# Patient Record
Sex: MALE | Race: WHITE
[De-identification: names, ages, dates, MRNs, and addresses within clinical notes are randomized per-mention and may not be internally consistent; named-entity substitution may affect disease eponyms.]

---

## 2017-02-24 ENCOUNTER — HOSPITAL ENCOUNTER (EMERGENCY)
Dept: HOSPITAL 25 - UCCORT | Age: 22
Discharge: LEFT BEFORE DISCHARGE | End: 2017-02-24
Payer: SELF-PAY

## 2017-02-24 DIAGNOSIS — Z53.21: ICD-10-CM

## 2017-02-24 DIAGNOSIS — R05: Primary | ICD-10-CM

## 2017-04-13 ENCOUNTER — HOSPITAL ENCOUNTER (EMERGENCY)
Dept: HOSPITAL 25 - UCCORT | Age: 22
Discharge: HOME | End: 2017-04-13
Payer: SELF-PAY

## 2017-04-13 VITALS — SYSTOLIC BLOOD PRESSURE: 122 MMHG | DIASTOLIC BLOOD PRESSURE: 63 MMHG

## 2017-04-13 DIAGNOSIS — J04.0: ICD-10-CM

## 2017-04-13 DIAGNOSIS — Z88.1: ICD-10-CM

## 2017-04-13 DIAGNOSIS — J06.9: Primary | ICD-10-CM

## 2017-04-13 PROCEDURE — 99211 OFF/OP EST MAY X REQ PHY/QHP: CPT

## 2017-04-13 PROCEDURE — G0463 HOSPITAL OUTPT CLINIC VISIT: HCPCS

## 2017-04-13 PROCEDURE — 87651 STREP A DNA AMP PROBE: CPT

## 2017-04-13 NOTE — UC
Respiratory Complaint HPI





- HPI Summary


HPI Summary: 





started with hoarse voice x 3 days, then last pm started with cough and 

subjective fever.  No SOB.  No hx pneumonia.  Does not smoke.  Hx 

tonsillectomy.  





- History of Current Complaint


Chief Complaint: UCRespiratory


Stated Complaint: FEVER COUGH


Time Seen by Provider: 04/13/17 08:04


Hx Obtained From: Patient


Onset/Duration: Gradual Onset, Lasting Days, Still Present


Timing: Constant


Severity Initially: Moderate


Severity Currently: Moderate


Pain Intensity: 0


Pain Scale Used: 0-10 Numeric


Character: Cough: Nonproductive


Aggravating Factors: Nothing


Alleviating Factors: Nothing


Associated Signs And Symptoms: Positive: URI, Nasal Congestion, Hoarseness





- Risk Factors


Pulmonary Embolism Risk Factors: Negative


Cardiac Risk Factors: Negative


Pseudomonas Risk Factors: Negative


Tuberculosis Risk Factors: Negative





- Allergies/Home Medications


Allergies/Adverse Reactions: 


 Allergies











Allergy/AdvReac Type Severity Reaction Status Date / Time


 


Clarithromycin [From Biaxin] Allergy  Hives Verified 04/13/17 07:46











Home Medications: 


 Home Medications





Dextromethorphan-Doxylamine-AC [Nite-Time Multi-Symptom C 15-6. mg] 2 cap 

PO ONCE PRN 04/13/17 [History Confirmed 04/13/17]











PMH/Surg Hx/FS Hx/Imm Hx


Previously Healthy: Yes


Endocrine History Of: 


   Denies: Diabetes, Thyroid Disease, Hyperthyroidism, Hypothyroidism, 

Dyslipidemia


Cardiovascular History Of: 


   Denies: Cardiac Disorders, Hypertension, Pacemaker/ICD, Myocardial Infarction

, Congestive Heart Failure, Atrial Fibrillation, Deep Vein Thrombosis, Bleeding 

Disorders


Respiratory History Of: 


   Denies: COPD, Asthma, Bronchitis, Pneumonia, Pulmonary Embolism


GI/ History Of: 


   Denies: Gastroesophageal Reflux, Ulcer, Gastrointestinal Bleed, Gall Bladder 

Disease, Kidney Stones, Diverticulitis, Renal Disease, Urosepsis


Neurological History Of: 


   Denies: TIA, CVA, Dementia, Seizures, Migraine


Psychological History Of: 


   Denies: Anxiety, Depression, Bipolar Disorder, Schizophrenia, Post Traumatic 

Stress Disorder


Cancer History Of: 


   Denies: Lung Cancer, Colorectal Cancer, Breast Cancer, Prostate Cancer, 

Cervical Cancer


Other History Of: 


   Negative For: HIV, Hepatitis B, Hepatitis C, Anticoagulant Therapy





- Surgical History


Surgical History: Yes


Surgery Procedure, Year, and Place: TONSILLECTOMY SUMMER 2016





- Family History


Known Family History: Positive: Hypertension


   Negative: Cardiac Disease





- Social History


Occupation: Student


Alcohol Use: None


Substance Use Type: None


Smoking Status (MU): Never Smoked Tobacco





- Immunization History


Vaccination Up to Date: Yes





Review of Systems


Constitutional: Fever


Skin: Negative


Eyes: Negative


ENT: Sore Throat, Other - hoarse voice


Respiratory: Negative


Cardiovascular: Negative


Gastrointestinal: Negative


Genitourinary: Negative


Motor: Negative


Neurovascular: Negative


Musculoskeletal: Negative


Neurological: Negative


Psychological: Negative


All Other Systems Reviewed And Are Negative: Yes





Physical Exam


Triage Information Reviewed: Yes


Appearance: No Pain Distress, Well-Nourished, Ill-Appearing


Vital Signs: 


 Initial Vital Signs











Temp  98.3 F   04/13/17 07:47


 


Pulse  87   04/13/17 07:47


 


Resp  16   04/13/17 07:47


 


BP  122/63   04/13/17 07:47


 


Pulse Ox  98   04/13/17 07:47











Vital Signs Reviewed: Yes


Eyes: Positive: Conjunctiva Clear


ENT: Positive: Hearing grossly normal, Pharyngeal erythema, TMs normal, Muffled/

hoarse voice


Neck: Positive: Supple, Nontender, No Lymphadenopathy


Respiratory: Positive: Lungs clear, Normal breath sounds, No respiratory 

distress, No accessory muscle use


Cardiovascular: Positive: RRR, No Murmur, Pulses Normal, Brisk Capillary Refill


Musculoskeletal: Positive: Strength Intact, ROM Intact


Neurological: Positive: Alert, Muscle Tone Normal


Psychological Exam: Normal


Skin Exam: Normal





UC Diagnostic Evaluation





- Laboratory


O2 Sat by Pulse Oximetry: 98





Respiratory Course/Dx





- Course


Course Of Treatment: rapid A neg





- Differential Dx/Diagnosis


Differential Diagnosis/HQI/PQRI: Bronchitis, Laryngitis, Other - strep


Provider Diagnoses: Laryngitis.  URI





Discharge





- Discharge Plan


Condition: Stable


Disposition: HOME


Patient Education Materials:  Laryngitis (ED), Acute Cough (ED)


Forms:  *School Release


Referrals: 


Non Staff,Doctor [Primary Care Provider] -

## 2017-04-17 ENCOUNTER — HOSPITAL ENCOUNTER (EMERGENCY)
Dept: HOSPITAL 25 - UCCORT | Age: 22
Discharge: HOME | End: 2017-04-17
Payer: COMMERCIAL

## 2017-04-17 VITALS — DIASTOLIC BLOOD PRESSURE: 55 MMHG | SYSTOLIC BLOOD PRESSURE: 102 MMHG

## 2017-04-17 DIAGNOSIS — R09.81: ICD-10-CM

## 2017-04-17 DIAGNOSIS — H10.31: Primary | ICD-10-CM

## 2017-04-17 DIAGNOSIS — R05: ICD-10-CM

## 2017-04-17 DIAGNOSIS — Z88.1: ICD-10-CM

## 2017-04-17 PROCEDURE — G0463 HOSPITAL OUTPT CLINIC VISIT: HCPCS

## 2017-04-17 PROCEDURE — 99212 OFFICE O/P EST SF 10 MIN: CPT

## 2017-04-17 NOTE — UC
Eye Complaint HPI





- HPI Summary


HPI Summary: 





PT P/W WITH RED IRRITATED EYE.  PT WOKE THIS AM WITH RT EYE CRUSTED SHUT.  

OFTEN GET PINK EYE WHEN HE HAS A COLD.   HAS HAD MILD COLD SX X5 DAYS.  COUGH, 

RUNNY NOSE.  NO ST, EAR ACHE





- History of Current Complaint


Chief Complaint: UCEye


Stated Complaint: RIGHT EYE COMPLAINT


Time Seen by Provider: 04/17/17 07:49


Hx Obtained From: Patient


Onset/Duration: Sudden Onset, Lasting Hours, Still Present


Timing: Constant


Severity Initially: Mild


Severity Currently: Mild


Pain Intensity: 1


Location of Injury: Conjunctiva, Sclera


Character: Dull


Aggravating Factor(s): Nothing


Alleviating Factor(s): Nothing


Associated Signs And Symptoms: Positive: Drainage (Purulent).  Negative: 

Photophobia, Vision Impairment Bilateral, Vision Impairment Right, Vision 

Impairment Left, Fever, Swelling


Related History: Other - PINK EYE





- Allergies/Home Medications


Allergies/Adverse Reactions: 


 Allergies











Allergy/AdvReac Type Severity Reaction Status Date / Time


 


Clarithromycin [From Biaxin] Allergy  Hives Verified 04/17/17 07:20











Home Medications: 


 Home Medications





Albuterol HFA INHALER* [Ventolin HFA Inhaler*] 2 puff INH Q6H PRN 04/17/17 [

History Confirmed 04/17/17]











PMH/Surg Hx/FS Hx/Imm Hx


Previously Healthy: Yes


Endocrine History Of: 


   Denies: Diabetes, Thyroid Disease, Hyperthyroidism, Hypothyroidism, 

Dyslipidemia


Cardiovascular History Of: 


   Denies: Cardiac Disorders, Hypertension, Pacemaker/ICD, Myocardial Infarction

, Congestive Heart Failure, Atrial Fibrillation, Deep Vein Thrombosis, Bleeding 

Disorders


Respiratory History Of: 


   Denies: COPD, Asthma, Bronchitis, Pneumonia, Pulmonary Embolism


GI/ History Of: 


   Denies: Gastroesophageal Reflux, Ulcer, Gastrointestinal Bleed, Gall Bladder 

Disease, Kidney Stones, Diverticulitis, Renal Disease, Urosepsis


Neurological History Of: 


   Denies: TIA, CVA, Dementia, Seizures, Migraine


Psychological History Of: 


   Denies: Anxiety, Depression, Bipolar Disorder, Schizophrenia, Post Traumatic 

Stress Disorder


Cancer History Of: 


   Denies: Lung Cancer, Colorectal Cancer, Breast Cancer, Prostate Cancer, 

Cervical Cancer


Other History Of: 


   Negative For: HIV, Hepatitis B, Hepatitis C, Anticoagulant Therapy





- Surgical History


Surgical History: Yes


Surgery Procedure, Year, and Place: TONSILLECTOMY SUMMER 2016





- Family History


Known Family History: Positive: Hypertension


   Negative: Cardiac Disease, Diabetes





- Social History


Occupation: Student


Alcohol Use: Occasionally


Substance Use Type: None


Smoking Status (MU): Never Smoked Tobacco





- Immunization History


Vaccination Up to Date: Yes





Review of Systems


Eyes: Drainage, Eye Redness


ENT: Nasal Discharge


Respiratory: Cough


Cardiovascular: Negative


Gastrointestinal: Negative


All Other Systems Reviewed And Are Negative: Yes





Physical Exam


Triage Information Reviewed: Yes


Appearance: Well-Appearing, No Pain Distress, Well-Nourished


Vital Signs: 


 Initial Vital Signs











Temp  98.2 F   04/17/17 07:21


 


Pulse  77   04/17/17 07:21


 


Resp  16   04/17/17 07:21


 


BP  102/55   04/17/17 07:21


 


Pulse Ox  97   04/17/17 07:21











Vital Signs Reviewed: Yes


Eyes: Positive: Conjunctiva Inflamed, Discharge - MIN PURULENT


ENT: Positive: Hearing grossly normal, Pharynx normal, Nasal congestion, Nasal 

drainage, TMs normal.  Negative: Tonsillar swelling, Muffled/hoarse voice


Dental Exam: Normal


Neck: Positive: Supple, Nontender, No Lymphadenopathy


Respiratory: Positive: Lungs clear, Normal breath sounds, No respiratory 

distress, No accessory muscle use


Cardiovascular: Positive: RRR, No Murmur


Musculoskeletal Exam: Normal


Neurological: Positive: Alert, Muscle Tone Normal


Psychological: Positive: Age Appropriate Behavior


Skin Exam: Normal





Eye Complaint Course/Dx





- Differential Dx/Diagnosis


Differential Diagnosis/HQI/PQRI: Conjunctivitis, Other - ALLERGIES


Provider Diagnoses: CONJUNCTIVITIS





Discharge





- Discharge Plan


Condition: Stable


Disposition: HOME


Prescriptions: 


Polymyx/Trimethoprim OPTH* [Polytrim OPHTH*] 1 drop RIGHT EYE Q3H #1 btl


Patient Education Materials:  Conjunctivitis (ED)


Forms:  *School Release


Referrals: 


Non Staff,Doctor [Primary Care Provider] -

## 2017-04-23 ENCOUNTER — HOSPITAL ENCOUNTER (EMERGENCY)
Dept: HOSPITAL 25 - UCCORT | Age: 22
Discharge: HOME | End: 2017-04-23
Payer: COMMERCIAL

## 2017-04-23 VITALS — SYSTOLIC BLOOD PRESSURE: 116 MMHG | DIASTOLIC BLOOD PRESSURE: 65 MMHG

## 2017-04-23 DIAGNOSIS — H10.33: Primary | ICD-10-CM

## 2017-04-23 DIAGNOSIS — Z88.1: ICD-10-CM

## 2017-04-23 PROCEDURE — G0463 HOSPITAL OUTPT CLINIC VISIT: HCPCS

## 2017-04-23 PROCEDURE — 99212 OFFICE O/P EST SF 10 MIN: CPT

## 2017-04-23 NOTE — UC
Eye Complaint HPI





- HPI Summary


HPI Summary: 





complaint of red ithcy right eye elina tsrted 1 week ago


 seen here 4/17/17 and strted polytrim eyes drops- used it for a few days and 

then stopped


spread to the other eye 5 days ago


still feels itchy and lots of duischarge from n=both eyes


 hasn't been wearing contact lenses


 denies vision changes, eye pain





- History of Current Complaint


Chief Complaint: UCEye


Stated Complaint: EYE-RECHECK


Time Seen by Provider: 04/23/17 08:43


Hx Obtained From: Patient





- Allergies/Home Medications


Allergies/Adverse Reactions: 


 Allergies











Allergy/AdvReac Type Severity Reaction Status Date / Time


 


Clarithromycin [From Biaxin] Allergy  Hives Verified 04/23/17 08:37














PMH/Surg Hx/FS Hx/Imm Hx


Previously Healthy: Yes


Endocrine History Of: 


   Denies: Diabetes, Thyroid Disease, Hyperthyroidism, Hypothyroidism, 

Dyslipidemia


Cardiovascular History Of: 


   Denies: Cardiac Disorders, Hypertension, Pacemaker/ICD, Myocardial Infarction

, Congestive Heart Failure, Atrial Fibrillation, Deep Vein Thrombosis, Bleeding 

Disorders


Respiratory History Of: 


   Denies: COPD, Asthma, Bronchitis, Pneumonia, Pulmonary Embolism


GI/ History Of: 


   Denies: Gastroesophageal Reflux, Ulcer, Gastrointestinal Bleed, Gall Bladder 

Disease, Kidney Stones, Diverticulitis, Renal Disease, Urosepsis


Neurological History Of: 


   Denies: TIA, CVA, Dementia, Seizures, Migraine


Psychological History Of: 


   Denies: Anxiety, Depression, Bipolar Disorder, Schizophrenia, Post Traumatic 

Stress Disorder


Cancer History Of: 


   Denies: Lung Cancer, Colorectal Cancer, Breast Cancer, Prostate Cancer, 

Cervical Cancer


Other History Of: 


   Negative For: HIV, Hepatitis B, Hepatitis C, Anticoagulant Therapy





- Surgical History


Surgical History: Yes


Surgery Procedure, Year, and Place: TONSILLECTOMY SUMMER 2016





- Family History


Known Family History: Positive: Hypertension


   Negative: Cardiac Disease, Diabetes





- Social History


Occupation: Student


Alcohol Use: Occasionally


Substance Use Type: None


Smoking Status (MU): Never Smoked Tobacco





- Immunization History


Vaccination Up to Date: Yes





Review of Systems


Constitutional: Negative


Skin: Negative


Eyes: Drainage, Eye Redness


ENT: Negative


Respiratory: Negative


Cardiovascular: Negative


Gastrointestinal: Negative


Genitourinary: Negative


Motor: Negative


Neurovascular: Negative


Musculoskeletal: Negative


Neurological: Negative


Psychological: Negative


All Other Systems Reviewed And Are Negative: Yes





Physical Exam


Triage Information Reviewed: Yes


Appearance: No Pain Distress, Well-Nourished


Vital Signs: 


 Initial Vital Signs











Temp  97.7 F   04/23/17 08:37


 


Pulse  72   04/23/17 08:37


 


Resp  16   04/23/17 08:37


 


BP  116/65   04/23/17 08:37


 


Pulse Ox  100   04/23/17 08:37











Vital Signs Reviewed: Yes


Eyes: Positive: Conjunctiva Inflamed, Discharge - bilaterally


ENT: Positive: Pharynx normal, TMs normal.  Negative: Nasal congestion


Neck: Positive: No Lymphadenopathy


Respiratory: Positive: Lungs clear, Normal breath sounds, No respiratory 

distress


Cardiovascular: Positive: RRR, No Murmur, Pulses Normal


Abdomen Description: Positive: Nontender, Soft


Bowel Sounds: Positive: Present


Musculoskeletal Exam: Normal


Neurological Exam: Normal


Psychological Exam: Normal


Skin Exam: Normal





Eye Complaint Course/Dx





- Differential Dx/Diagnosis


Differential Diagnosis/HQI/PQRI: Conjunctivitis


Provider Diagnoses: conjunctivitis





Discharge





- Discharge Plan


Condition: Stable


Disposition: HOME


Patient Education Materials:  Conjunctivitis (ED)


Referrals: 


Non Staff,Doctor [Primary Care Provider] - 


[facility] [place] [facility] [place]]


Additional Instructions: 


CONJUNCTIVITIS 


What is Conjunctivitis? 


Conjunctivitis is redness and swelling of the conjunctiva, the thin transparent 

layer that lines the inner eyelid and covers the white part of the eye. 


The three main types of conjunctivitis are infectious, allergic, and chemical. 

The infectious type, commonly called "pink eye," is caused by a contagious 

virus or by bacteria. Your body's allergies to pollen, cosmetics, animals or 

fabrics often bring on allergic conjunctivitis. Irritants like air pollution, 

noxious fumes and chlorine in swimming pools may produce the chemical form. 


Symptoms Might Include: 


 More tearing 


 Eye pain 


 Redness in the eyes 


 Gritty feeling in the eyes 


 Itching of the eye 


 Blurred vision 


 Sensitivity to light 


 Crusts that form on the eyelid overnight 





Treatment Recommendations: 


 Use eye drops or ointment as directed. 


 Do not rub or touch your eyes. 


 Wash your hands frequently. 


 Use cool compresses to relieve pain and itching. 





Prevention: 


 Do not share eye make-up. 


 Replace eye make-up frequently. 


 Do not share towels, washcloths, etc. 


 Do not share eye drops. 


 Disinfect and handle contact lenses properly. 





Call Your Doctor or Return Here IF: 


 Your symptoms worsen or do not improve in 3 to 4 days. 


 You have problems with, or loss of, your vision. 


 You have a significant increase in pain. 


 You have any new symptoms that worry you.

## 2018-02-11 ENCOUNTER — HOSPITAL ENCOUNTER (EMERGENCY)
Dept: HOSPITAL 25 - UCCORT | Age: 23
Discharge: HOME | End: 2018-02-11
Payer: COMMERCIAL

## 2018-02-11 VITALS — SYSTOLIC BLOOD PRESSURE: 104 MMHG | DIASTOLIC BLOOD PRESSURE: 55 MMHG

## 2018-02-11 DIAGNOSIS — J40: ICD-10-CM

## 2018-02-11 DIAGNOSIS — J02.9: ICD-10-CM

## 2018-02-11 DIAGNOSIS — J06.9: Primary | ICD-10-CM

## 2018-02-11 PROCEDURE — G0463 HOSPITAL OUTPT CLINIC VISIT: HCPCS

## 2018-02-11 PROCEDURE — 87651 STREP A DNA AMP PROBE: CPT

## 2018-02-11 PROCEDURE — 99211 OFF/OP EST MAY X REQ PHY/QHP: CPT

## 2018-02-11 NOTE — UC
Throat Pain/Nasal Rene HPI





- HPI Summary


HPI Summary: 





pt is c/o nasal congestion, sore throat and cough with chest congestion. this 

is day 3 and he notes a little improvement. he denies sinus pain, fever, sob, 

cp and wheezing. he notes a hx of strep throat but not since tonsils were 

removed.





- History of Current Complaint


Stated Complaint: COUGH/CHEST CONGESTION


Time Seen by Provider: 02/11/18 13:19


Hx Obtained From: Patient


Onset/Duration: Gradual Onset


Severity: Mild


Cough: Productive


Associated Signs & Symptoms: Positive: Nasal Discharge.  Negative: Wheezing, 

Sinus Discomfort, Fever





- Epiglottits Risk Factors


Epiglottis Risk Factors: Negative





- Allergies/Home Medications


Allergies/Adverse Reactions: 


 Allergies











Allergy/AdvReac Type Severity Reaction Status Date / Time


 


MS Clarithromycin Allergy  Hives Verified 02/11/18 13:33





[From [facility]]     











Home Medications: 


 Home Medications





NK [No Home Medications Reported]  02/11/18 [History Confirmed 02/11/18]











PMH/Surg Hx/FS Hx/Imm Hx


Previously Healthy: Yes


Other History Of: 


   Negative For: HIV, Hepatitis B, Hepatitis C, Anticoagulant Therapy





- Surgical History


Surgical History: Yes


Surgery Procedure, Year, and Place: TONSILLECTOMY SUMMER 2016





- Family History


Known Family History: Positive: Hypertension


   Negative: Cardiac Disease, Diabetes





- Social History


Occupation: Student


Alcohol Use: Occasionally


Substance Use Type: None


Smoking Status (MU): Never Smoked Tobacco





- Immunization History


Vaccination Up to Date: Yes





Review of Systems


Constitutional: Negative


Skin: Negative


Eyes: Negative


ENT: Sore Throat, Nasal Discharge, Sinus Congestion


Respiratory: Cough


Cardiovascular: Negative


Gastrointestinal: Negative


Genitourinary: Negative


Musculoskeletal: Negative


Neurological: Negative


Psychological: Negative


Is Patient Immunocompromised?: No


All Other Systems Reviewed And Are Negative: Yes





Physical Exam


Triage Information Reviewed: Yes


Appearance: Well-Appearing


Vital Signs Reviewed: Yes


Eye Exam: Normal


ENT: Positive: Pharyngeal erythema, Nasal congestion, TMs normal, Uvula 

midline.  Negative: Nasal drainage, Tonsillar swelling, Tonsillar exudate, 

Trismus, Muffled voice, Hoarse voice, Sinus tenderness


Neck: Positive: Supple, Nontender, No Lymphadenopathy


Respiratory: Positive: Lungs clear, Normal breath sounds, No respiratory 

distress, Other: - cough is mildly congested


Cardiovascular: Positive: RRR, No Murmur


Abdomen Description: Positive: Nontender, No Organomegaly, Soft


Bowel Sounds: Positive: Present


Neurological: Positive: Alert


Psychological: Positive: Age Appropriate Behavior


Skin Exam: Normal





Throat Pain/Nasal Course/Dx





- Course


Course Of Treatment: nothing to suggest bacterial sinus or lung infection. 

rapid strep=negative. tx supportive.





- Differential Dx/Diagnosis


Provider Diagnoses: URI, sore throat, bronchitis





Discharge





- Discharge Plan


Condition: Stable


Disposition: HOME


Patient Education Materials:  Pharyngitis (ED), Acute Bronchitis (ED), Upper 

Respiratory Infection (ED)


Referrals: 


Non Staff,Doctor [Primary Care Provider] -

## 2018-03-07 ENCOUNTER — HOSPITAL ENCOUNTER (EMERGENCY)
Dept: HOSPITAL 25 - UCCORT | Age: 23
Discharge: HOME | End: 2018-03-07
Payer: COMMERCIAL

## 2018-03-07 VITALS — DIASTOLIC BLOOD PRESSURE: 65 MMHG | SYSTOLIC BLOOD PRESSURE: 110 MMHG

## 2018-03-07 DIAGNOSIS — K52.9: Primary | ICD-10-CM

## 2018-03-07 DIAGNOSIS — Z88.1: ICD-10-CM

## 2018-03-07 PROCEDURE — 99211 OFF/OP EST MAY X REQ PHY/QHP: CPT

## 2018-03-07 PROCEDURE — G0463 HOSPITAL OUTPT CLINIC VISIT: HCPCS

## 2018-03-07 PROCEDURE — 87502 INFLUENZA DNA AMP PROBE: CPT

## 2018-03-07 NOTE — UC
Abdominal Pain Male HPI





- HPI Summary


HPI Summary: 





nausea / vomiting x 1 day


+ high fever, chill , body aches,


+ diarrhea


also c/o of cough , sore throat, and nasal congestion 


was seen at the hospital this am , was given IV hydration 


concern about Flu





- History of Current Complaint


Chief Complaint: UCGeneralIllness


Stated Complaint: FLU SXS


Time Seen by Provider: 03/07/18 18:33


Hx Obtained From: Patient, Family/Caretaker


Onset/Duration: Gradual Onset, Lasting Days - 1, Still Present


Timing: Constant


Severity Initially: Severe


Severity Currently: Severe


Pain Intensity: 5


Pain Scale Used: 0-10 Numeric


Location: Diffuse


Radiates: No


Character: Cramping


Aggravating Factor(s): Food


Alleviating Factor(s): Rest


Associated Signs And Symptoms: Positive: Diaphoresis, Fever, Cough, Nausea, 

Vomiting, Diarrhea.  Negative: Chest Pain, Dizzy, Back Pain, Constipation, 

Blood in Stool, Urinary Symptoms, Decreased Appetite, Penile Discharge, Other





- Allergies/Home Medications


Allergies/Adverse Reactions: 


 Allergies











Allergy/AdvReac Type Severity Reaction Status Date / Time


 


MS Clarithromycin Allergy  Hives Verified 02/11/18 13:33





[From Biaxin]     











Home Medications: 


 Home Medications





Ondansetron ODT TAB* [Zofran 4 MG Odt TAB*]  03/07/18 [History]











PMH/Surg Hx/FS Hx/Imm Hx


Previously Healthy: Yes


Other History Of: 


   Negative For: HIV, Hepatitis B, Hepatitis C, Anticoagulant Therapy





- Surgical History


Surgical History: Yes


Surgery Procedure, Year, and Place: TONSILLECTOMY SUMMER 2016.  WISDOM TEETH





- Family History


Known Family History: Positive: Hypertension


   Negative: Cardiac Disease, Diabetes





- Social History


Alcohol Use: Occasionally


Substance Use Type: None


Smoking Status (MU): Never Smoked Tobacco





- Immunization History


Vaccination Up to Date: Yes





Review of Systems


Constitutional: Fever, Chills, Fatigue


Skin: Negative


Eyes: Negative


ENT: Sore Throat, Nasal Discharge


Respiratory: Cough


Cardiovascular: Negative


Gastrointestinal: Abdominal Pain, Vomiting, Diarrhea, Nausea


Genitourinary: Negative


Is Patient Immunocompromised?: No


All Other Systems Reviewed And Are Negative: Yes





Physical Exam


Triage Information Reviewed: Yes


Appearance: Well-Appearing, Ill-Appearing, Pain Distress


Vital Signs: 


 Initial Vital Signs











Temp  102.0 F   03/07/18 18:39


 


Pulse  120   03/07/18 18:39


 


Resp  18   03/07/18 18:39


 


BP  110/65   03/07/18 18:39


 


Pulse Ox  99   03/07/18 18:39











Vital Signs Reviewed: Yes


Eyes: Positive: Conjunctiva Clear


ENT Exam: Normal


ENT: Positive: Normal ENT inspection, Hearing grossly normal, Pharynx normal


Neck: Positive: Supple, Nontender, No Lymphadenopathy


Respiratory Exam: Normal


Respiratory: Positive: Chest non-tender, Lungs clear, Normal breath sounds


Cardiovascular: Positive: No Murmur, Tachycardia


Abdomen Description: Positive: Nontender, No Organomegaly, Soft.  Negative: CVA 

Tenderness (R), CVA Tenderness (L), Distended, Guarding


Bowel Sounds: Positive: Present


Skin Exam: Normal





Abd Pain Male Course/Dx





- Differential Dx/Clinical Impression


Provider Diagnoses: gastroenteritis





Discharge





- Discharge Plan


Condition: Stable


Disposition: HOME


Patient Education Materials:  Gastroenteritis (ED)


Forms:  *Gen. Provider Communication, *School Release


Referrals: 


Non Staff,Doctor [Primary Care Provider] - 3 Days

## 2018-12-04 ENCOUNTER — HOSPITAL ENCOUNTER (EMERGENCY)
Dept: HOSPITAL 25 - UCCORT | Age: 23
Discharge: HOME | End: 2018-12-04
Payer: COMMERCIAL

## 2018-12-04 VITALS — DIASTOLIC BLOOD PRESSURE: 66 MMHG | SYSTOLIC BLOOD PRESSURE: 132 MMHG

## 2018-12-04 DIAGNOSIS — J06.9: Primary | ICD-10-CM

## 2018-12-04 DIAGNOSIS — Z88.1: ICD-10-CM

## 2018-12-04 PROCEDURE — G0463 HOSPITAL OUTPT CLINIC VISIT: HCPCS

## 2018-12-04 PROCEDURE — 99211 OFF/OP EST MAY X REQ PHY/QHP: CPT

## 2018-12-04 NOTE — UC
Throat Pain/Nasal Rene HPI





- HPI Summary


HPI Summary: 





cough x 3 days 


cough non-productive, worse with exertion and deep breathing


+ nasal congestion , plugged ears


no fever, + chills, + body aches





- History of Current Complaint


Chief Complaint: UCRespiratory


Stated Complaint: COUGH,CONGESTION


Time Seen by Provider: 12/04/18 07:29


Hx Obtained From: Patient


Onset/Duration: Gradual Onset, Lasting Days - 3, Still Present


Severity: Moderate


Pain Intensity: 0


Cough: Nonproductive


Associated Signs & Symptoms: Positive: Nasal Discharge.  Negative: Dysphagia, 

FB Sensation, Drooling, Wheezing, Hoarseness, Sinus Discomfort, Fever, Vomiting

, Rash





- Allergies/Home Medications


Allergies/Adverse Reactions: 


 Allergies











Allergy/AdvReac Type Severity Reaction Status Date / Time


 


clarithromycin Allergy  Hives Verified 12/04/18 07:15











Home Medications: 


 Home Medications





Dm/Acetaminophen/Doxylamine [Vicks Nyquil Liquicaps] 2 each PO QPM PRN 12/04/18 

[History Confirmed 12/04/18]











PMH/Surg Hx/FS Hx/Imm Hx


Previously Healthy: Yes


Other History Of: 


   Negative For: HIV, Hepatitis B, Hepatitis C, Anticoagulant Therapy





- Surgical History


Surgical History: Yes


Surgery Procedure, Year, and Place: TONSILECTOMY SUMMER 2016.  WISDOM TEETH





- Family History


Known Family History: Positive: Hypertension


   Negative: Cardiac Disease, Diabetes





- Social History


Alcohol Use: Occasionally


Substance Use Type: None


Smoking Status (MU): Never Smoked Tobacco





- Immunization History


Vaccination Up to Date: Yes





Review of Systems


All Other Systems Reviewed And Are Negative: Yes


Constitutional: Positive: Chills


Skin: Positive: Negative


Eyes: Positive: Negative


ENT: Positive: Nasal Discharge


Respiratory: Positive: Cough


Cardiovascular: Positive: Negative


Is Patient Immunocompromised?: No





Physical Exam


Triage Information Reviewed: Yes


Appearance: Well-Appearing, No Pain Distress, Well-Nourished


Vital Signs: 


 Initial Vital Signs











Temp  98.4 F   12/04/18 07:18


 


Pulse  86   12/04/18 07:18


 


Resp  18   12/04/18 07:18


 


BP  132/66   12/04/18 07:18


 


Pulse Ox  86   12/04/18 07:18











Eyes: Positive: Conjunctiva Clear


ENT: Positive: Pharynx normal, Nasal congestion, Nasal drainage, TMs normal.  

Negative: Pharyngeal erythema, TM bulging, TM dull, TM red


Neck: Positive: Supple, Nontender, No Lymphadenopathy


Respiratory: Positive: Chest non-tender, Lungs clear, Normal breath sounds


Cardiovascular: Positive: RRR, No Murmur


Skin Exam: Normal





Throat Pain/Nasal Course/Dx





- Differential Dx/Diagnosis


Provider Diagnosis: 


 URI (upper respiratory infection)








Discharge





- Sign-Out/Discharge


Documenting (check all that apply): Patient Departure


All imaging exams completed and their final reports reviewed: No Studies





- Discharge Plan


Condition: Stable


Disposition: HOME


Patient Education Materials:  Cerumen Impaction (ED), Upper Respiratory 

Infection (DC)


Forms:  *School Release


Referrals: 


No Primary Care Phys,NOPCP [Primary Care Provider] - If Needed





- Billing Disposition and Condition


Condition: STABLE


Disposition: Home

## 2019-02-11 ENCOUNTER — HOSPITAL ENCOUNTER (EMERGENCY)
Dept: HOSPITAL 25 - UCCORT | Age: 24
Discharge: HOME HEALTH | End: 2019-02-11
Payer: COMMERCIAL

## 2019-02-11 VITALS — DIASTOLIC BLOOD PRESSURE: 60 MMHG | SYSTOLIC BLOOD PRESSURE: 118 MMHG

## 2019-02-11 DIAGNOSIS — R07.89: Primary | ICD-10-CM

## 2019-02-11 DIAGNOSIS — Z88.1: ICD-10-CM

## 2019-02-11 DIAGNOSIS — R05: ICD-10-CM

## 2019-02-11 PROCEDURE — G0463 HOSPITAL OUTPT CLINIC VISIT: HCPCS

## 2019-02-11 PROCEDURE — 99212 OFFICE O/P EST SF 10 MIN: CPT

## 2019-02-11 PROCEDURE — 93005 ELECTROCARDIOGRAM TRACING: CPT

## 2019-02-11 NOTE — UC
Cardiac HPI





- HPI Summary


HPI Summary: 





24 yo male presents with chest pain. He tells me that for the last few weeks he 

has been having trouble with a dry cough that is persistent. On 2/8 he worked 

out at the gym, which is usual for him. On 2/9 he developed left sided chest 

pain that was worse with deep breaths. This has persisted through today. He 

says that the pain is more noticeable when lying down. Has not taken anything 

OTC for his symptoms. Denies fever, chills, sore throat, SOB, palpitations, 

abdominal pain, n/v.








- History of Current Complaint


Chief Complaint: UCChestPain


Stated Complaint: CHEST PAIN


Time Seen by Provider: 02/11/19 14:48


Hx Obtained From: Patient


Onset/Duration: Sudden Onset


Initial Severity: Moderate


Current Severity: Moderate


Pain Intensity: 5





- Allergy/Home Medications


Allergies/Adverse Reactions: 


 Allergies











Allergy/AdvReac Type Severity Reaction Status Date / Time


 


clarithromycin Allergy  Hives Verified 12/04/18 07:15











Home Medications: 


 Home Medications





NK [No Home Medications Reported]  02/11/19 [History Confirmed 02/11/19]











PMH/Surg Hx/FS Hx/Imm Hx





- Additional Past Medical History


Additional PMH: 





None


Other History Of: 


   Negative For: HIV, Hepatitis B, Hepatitis C, Anticoagulant Therapy





- Surgical History


Surgical History: Yes


Surgery Procedure, Year, and Place: TONSILECTOMY SUMMER 2016.  WISDOM TEETH





- Family History


Known Family History: Positive: Hypertension


   Negative: Cardiac Disease, Diabetes





- Social History


Occupation: Student


Lives: Dormitory/Roommates


Alcohol Use: Weekly


Substance Use Type: None


Smoking Status (MU): Never Smoked Tobacco





- Immunization History


Vaccination Up to Date: Yes





Review of Systems


All Other Systems Reviewed And Are Negative: Yes


Constitutional: Positive: Negative


Skin: Positive: Negative


Eyes: Positive: Negative


ENT: Positive: Negative


Respiratory: Positive: Cough


Cardiovascular: Positive: Chest Pain


Gastrointestinal: Positive: Negative


Neurovascular: Positive: Negative


Neurological: Positive: Negative


Psychological: Positive: Negative





Physical Exam





- Summary


Physical Exam Summary: 





GENERAL: NAD. WDWN. No pain distress.


SKIN: No rashes, sores, or open wounds.


HEENT:


            Head: AT/NC


            Eyes: PERRLA. EOM intact. Conjunctiva clear without inflammation or 

discharge.


            Ears: Hearing grossly normal. TMs intact, no bulging, erythema, or 

edema. 


            Nose: Nasal mucosa pink and moist. NTTP maxillary and frontal 

sinus. 


            Throat: Posterior oropharynx without exudates, erythema, or 

tonsillar enlargement.  Uvula midline.


NECK: Supple. Nontender. No lymphadenopathy. 


CHEST:  CTAB. No r/r/w. No accessory muscle use. Breathing comfortably and in 

no distress.


CV:  RRR. Without m/r/g. Pulses intact. Brisk cap refill. No friction rub 

appreciated


ABDOMEN:  Soft. NTTP. No distention or guarding. No CVA tenderness. Bowel 

sounds present


MSK: FROM and 5/5 strength throughout. No edema. Chest wall NTTP


NEURO: Alert. CN II-XII grossly intact.


PSYCH: Age appropriate behavior.


Triage Information Reviewed: Yes


Vital Signs: 


 Initial Vital Signs











Temp  97.3 F   02/11/19 14:46


 


Pulse  70   02/11/19 14:46


 


Resp  16   02/11/19 14:46


 


BP  118/60   02/11/19 14:46


 


Pulse Ox  99   02/11/19 14:46











Vital Signs Reviewed: Yes





- Assessment/Plan


Course Of Treatment: EKG: Sinus joi at 58bpm with prolonged TX and atrial 

premature complex. No ST changes as read by Dr. Mon. Given his symptoms, 

abrupt onset, and following a prolonged, suspected, viral illness - there is 

concern for pericarditis. Therefore I have recommended pt be further evaluated 

in the ED for his symptoms. He was agreeable to this.





- Clinical Impression


Provider Diagnosis: 


 Chest pain








Discharge





- Sign-Out/Discharge


Documenting (check all that apply): Patient Departure


All imaging exams completed and their final reports reviewed: No Studies





- Discharge Plan


Condition: Stable


Disposition: HOME-RECOMMEND TO ED


Referrals: 


No Primary Care Phys,NOPCP [Primary Care Provider] - 


Additional Instructions: 


Please go to the ER for further evaluation of your chest pain





- Billing Disposition and Condition


Condition: STABLE


Disposition: Home-Recommend to ED

## 2019-09-17 ENCOUNTER — APPOINTMENT (OUTPATIENT)
Dept: DERMATOLOGY | Facility: CLINIC | Age: 24
End: 2019-09-17

## 2022-02-15 ENCOUNTER — APPOINTMENT (OUTPATIENT)
Dept: DERMATOLOGY | Facility: CLINIC | Age: 27
End: 2022-02-15
Payer: COMMERCIAL

## 2022-02-15 PROCEDURE — 99203 OFFICE O/P NEW LOW 30 MIN: CPT

## 2022-05-19 ENCOUNTER — APPOINTMENT (OUTPATIENT)
Dept: DERMATOLOGY | Facility: CLINIC | Age: 27
End: 2022-05-19
Payer: COMMERCIAL

## 2022-05-19 PROCEDURE — 99213 OFFICE O/P EST LOW 20 MIN: CPT

## 2022-07-18 ENCOUNTER — APPOINTMENT (OUTPATIENT)
Dept: DERMATOLOGY | Facility: CLINIC | Age: 27
End: 2022-07-18

## 2022-07-18 PROCEDURE — 99213 OFFICE O/P EST LOW 20 MIN: CPT

## 2022-12-16 ENCOUNTER — APPOINTMENT (OUTPATIENT)
Dept: OTOLARYNGOLOGY | Facility: CLINIC | Age: 27
End: 2022-12-16

## 2022-12-16 VITALS
SYSTOLIC BLOOD PRESSURE: 132 MMHG | WEIGHT: 165 LBS | HEIGHT: 68 IN | DIASTOLIC BLOOD PRESSURE: 74 MMHG | BODY MASS INDEX: 25.01 KG/M2 | HEART RATE: 56 BPM

## 2022-12-16 DIAGNOSIS — J39.2 OTHER DISEASES OF PHARYNX: ICD-10-CM

## 2022-12-16 DIAGNOSIS — R07.0 PAIN IN THROAT: ICD-10-CM

## 2022-12-16 DIAGNOSIS — Z83.49 FAMILY HISTORY OF OTHER ENDOCRINE, NUTRITIONAL AND METABOLIC DISEASES: ICD-10-CM

## 2022-12-16 PROCEDURE — 99204 OFFICE O/P NEW MOD 45 MIN: CPT

## 2022-12-16 RX ORDER — AZITHROMYCIN 250 MG/1
250 TABLET, FILM COATED ORAL
Refills: 0 | Status: ACTIVE | COMMUNITY

## 2022-12-16 RX ORDER — OMEPRAZOLE 20 MG/1
20 CAPSULE, DELAYED RELEASE ORAL
Qty: 90 | Refills: 2 | Status: ACTIVE | COMMUNITY
Start: 2022-12-16 | End: 1900-01-01

## 2022-12-16 RX ORDER — AMOXICILLIN 500 MG/1
CAPSULE ORAL
Refills: 0 | Status: ACTIVE | COMMUNITY

## 2022-12-16 RX ORDER — METHYLPREDNISOLONE 4 MG/1
4 TABLET ORAL
Qty: 1 | Refills: 0 | Status: ACTIVE | COMMUNITY
Start: 2022-12-16 | End: 1900-01-01

## 2022-12-20 PROBLEM — R07.0 THROAT DISCOMFORT: Status: ACTIVE | Noted: 2022-12-16

## 2022-12-20 NOTE — HISTORY OF PRESENT ILLNESS
[de-identified] : 27 year old male presents for an initial evaluation for throat tightness. States for the last two weeks has muscle tightness from neck radiating the the left shoulder feeling has subsided but now his throat feels very dry, tight and narrowing of a throat, feels very weird to swallow, and voice is a little raspy. States went to primary this Monday was advised throat was red, was given Zpack and Amoxicillin with no relief. Patient denies fevers, dysphagia, odynophagia, dyspnea, dysphonia or otalgia. \par \par

## 2022-12-20 NOTE — PROCEDURE
[FreeTextEntry6] : Bilateral nasal endoscopy:\par \par Left:\par Septum midline\par Mild inferior turbinate hypertrophy \par Middle meatus clear, without masses, polyps, or pus\par Sphenoethmoidal recess clear, without masses, polyps, or pus\par \par Right: \par Septum midline\par Mild inferior turbinate hypertrophy\par Middle meatus clear, without masses, polyps, or pus \par Sphenoethmoidal recess clear, without masses, polyps, or pus\par \par  [de-identified] : NPL:\par Nasopharynx clear without masses\par Base of tongue without masses or lesions\par Vallecula clear\par Pyriforms clear\par Epiglottis crisp\par TVFs mobile bilaterally \par Postcricoid erythema\par Glottic airway patent\par

## 2022-12-20 NOTE — PHYSICAL EXAM
[Nasal Endoscopy Performed] : nasal endoscopy was performed, see procedure section for findings [Normal] : palatine tonsils are normal [Laryngoscopy Performed] : laryngoscopy was performed, see procedure section for findings

## 2022-12-20 NOTE — CONSULT LETTER
[Dear  ___] : Dear  [unfilled], [Consult Letter:] : I had the pleasure of evaluating your patient, [unfilled]. [Please see my note below.] : Please see my note below. [Consult Closing:] : Thank you very much for allowing me to participate in the care of this patient.  If you have any questions, please do not hesitate to contact me. [Sincerely,] : Sincerely, [FreeTextEntry2] : Dr Martín Nice [FreeTextEntry3] : Erik Almanza MD, MIRIAM\par Otolaryngology \par Sinus and Endoscopic Skull Base Surgery \par Head and Neck Surgery\par \par [address], [facility] [address]\[place] [address]\par \par [address] [address]\par \par Tel: [phone]\par Fax:[phone]

## 2023-02-09 ENCOUNTER — APPOINTMENT (OUTPATIENT)
Dept: DERMATOLOGY | Facility: CLINIC | Age: 28
End: 2023-02-09
Payer: COMMERCIAL

## 2023-02-09 PROCEDURE — 99212 OFFICE O/P EST SF 10 MIN: CPT

## 2023-03-02 ENCOUNTER — APPOINTMENT (OUTPATIENT)
Dept: DERMATOLOGY | Facility: CLINIC | Age: 28
End: 2023-03-02

## 2024-01-29 ENCOUNTER — NON-APPOINTMENT (OUTPATIENT)
Age: 29
End: 2024-01-29

## 2024-01-30 ENCOUNTER — OUTPATIENT (OUTPATIENT)
Dept: OUTPATIENT SERVICES | Facility: HOSPITAL | Age: 29
LOS: 1 days | Discharge: HOME | End: 2024-01-30

## 2024-01-30 DIAGNOSIS — R79.9 ABNORMAL FINDING OF BLOOD CHEMISTRY, UNSPECIFIED: ICD-10-CM

## 2024-01-31 ENCOUNTER — RESULT REVIEW (OUTPATIENT)
Age: 29
End: 2024-01-31

## 2024-01-31 ENCOUNTER — APPOINTMENT (OUTPATIENT)
Dept: HEMATOLOGY ONCOLOGY | Facility: CLINIC | Age: 29
End: 2024-01-31
Payer: COMMERCIAL

## 2024-01-31 VITALS
HEART RATE: 57 BPM | WEIGHT: 168.87 LBS | SYSTOLIC BLOOD PRESSURE: 102 MMHG | DIASTOLIC BLOOD PRESSURE: 74 MMHG | BODY MASS INDEX: 25.59 KG/M2 | OXYGEN SATURATION: 100 % | HEIGHT: 68 IN | TEMPERATURE: 98 F

## 2024-01-31 DIAGNOSIS — D69.6 THROMBOCYTOPENIA, UNSPECIFIED: ICD-10-CM

## 2024-01-31 LAB
BASOPHILS # BLD AUTO: 0.1 K/UL — SIGNIFICANT CHANGE UP (ref 0–0.2)
BASOPHILS NFR BLD AUTO: 1.5 % — SIGNIFICANT CHANGE UP (ref 0–2)
EOSINOPHIL # BLD AUTO: 0.1 K/UL — SIGNIFICANT CHANGE UP (ref 0–0.5)
EOSINOPHIL NFR BLD AUTO: 2.6 % — SIGNIFICANT CHANGE UP (ref 0–6)
HCT VFR BLD CALC: 47.8 % — SIGNIFICANT CHANGE UP (ref 39–50)
HGB BLD-MCNC: 15.3 G/DL — SIGNIFICANT CHANGE UP (ref 13–17)
LYMPHOCYTES # BLD AUTO: 1.9 K/UL — SIGNIFICANT CHANGE UP (ref 1–3.3)
LYMPHOCYTES # BLD AUTO: 36.7 % — SIGNIFICANT CHANGE UP (ref 13–44)
MCHC RBC-ENTMCNC: 28.4 PG — SIGNIFICANT CHANGE UP (ref 27–34)
MCHC RBC-ENTMCNC: 31.9 G/DL — LOW (ref 32–36)
MCV RBC AUTO: 89 FL — SIGNIFICANT CHANGE UP (ref 80–100)
MONOCYTES # BLD AUTO: 0.4 K/UL — SIGNIFICANT CHANGE UP (ref 0–0.9)
MONOCYTES NFR BLD AUTO: 7.8 % — SIGNIFICANT CHANGE UP (ref 2–14)
NEUTROPHILS # BLD AUTO: 2.7 K/UL — SIGNIFICANT CHANGE UP (ref 1.8–7.4)
NEUTROPHILS NFR BLD AUTO: 51.5 % — SIGNIFICANT CHANGE UP (ref 43–77)
PLATELET # BLD AUTO: 187 K/UL — SIGNIFICANT CHANGE UP (ref 150–400)
RBC # BLD: 5.37 M/UL — SIGNIFICANT CHANGE UP (ref 4.2–5.8)
RBC # FLD: 11.8 % — SIGNIFICANT CHANGE UP (ref 10.3–14.5)
WBC # BLD: 5.2 K/UL — SIGNIFICANT CHANGE UP (ref 3.8–10.5)
WBC # FLD AUTO: 5.2 K/UL — SIGNIFICANT CHANGE UP (ref 3.8–10.5)

## 2024-01-31 PROCEDURE — 99205 OFFICE O/P NEW HI 60 MIN: CPT

## 2024-01-31 NOTE — ASSESSMENT
[FreeTextEntry1] : Patient is a 28 M with PMHx celiac disease referred for thrombocytopenia.   Patient went to PCP for medical clearance prior to colonoscopy for blood in stool and with wiping. During medical clearance, platelet count was found to be 77K. Patient was referred to another hematologist who did repeat CBC, which showed a normal platelet count. Patient presents here today for second opinion and reassurance.   #Thrombocytopenia - Platelet count 187K on today's CBC - Likely patient had pseudo thrombocytopenia, clumping on previous lab draw - Patient asymptomatic as well - Will follow up iron studies, B12/folate, HIV, hepatitis panel, blue top platelet count, CMP - Patient cleared from a hematology standpoint for upcoming colonoscopy

## 2024-01-31 NOTE — HISTORY OF PRESENT ILLNESS
[de-identified] : Patient is a 28 M with PMHx celiac disease referred for thrombocytopenia.   Patient went to PCP for medical clearance prior to colonoscopy for blood in stool and with wiping. During medical clearance, platelet count was found to be 77K. Patient was referred to another hematologist who did repeat CBC, which showed a normal platelet count. Patient presents here today for second opinion and reassurance.   Never has had low platelet count before. Denies petechial rash, purpura, hematuria, tarry stools, hematemesis, mucosal bleeding. Blood in stool resolved after one week. Not on any medications including blood thinners or antiplatelets. FHx significant for cold agglutinin hemolytic anemia in maternal grandfather, breast cancer in maternal grandmother. Social alcohol use (2-3 drinks/month), no other drug use. Had COVID infection in December 2023 but no other recent infections. No recent travel.

## 2024-02-02 ENCOUNTER — NON-APPOINTMENT (OUTPATIENT)
Age: 29
End: 2024-02-02

## 2024-02-02 LAB
ALBUMIN SERPL ELPH-MCNC: 5 G/DL
ALP BLD-CCNC: 44 U/L
ALT SERPL-CCNC: 32 U/L
ANION GAP SERPL CALC-SCNC: 11 MMOL/L
AST SERPL-CCNC: 32 U/L
BILIRUB SERPL-MCNC: 0.5 MG/DL
BUN SERPL-MCNC: 13 MG/DL
CALCIUM SERPL-MCNC: 9.9 MG/DL
CHLORIDE SERPL-SCNC: 102 MMOL/L
CO2 SERPL-SCNC: 27 MMOL/L
CREAT SERPL-MCNC: 0.95 MG/DL
EGFR: 113 ML/MIN/1.73M2
FERRITIN SERPL-MCNC: 111 NG/ML
FOLATE SERPL-MCNC: 7.5 NG/ML
GLUCOSE SERPL-MCNC: 102 MG/DL
HAV IGM SER QL: NONREACTIVE
HBV CORE IGM SER QL: NONREACTIVE
HBV SURFACE AG SER QL: NONREACTIVE
HCV AB SER QL: NONREACTIVE
HCV S/CO RATIO: 0.09 S/CO
HIV1+2 AB SPEC QL IA.RAPID: NONREACTIVE
IRON SATN MFR SERPL: 67 %
IRON SERPL-MCNC: 216 UG/DL
PLATELET # PLAS AUTO: 173 K/UL
POTASSIUM SERPL-SCNC: 4.4 MMOL/L
PROT SERPL-MCNC: 6.9 G/DL
SODIUM SERPL-SCNC: 139 MMOL/L
TIBC SERPL-MCNC: 325 UG/DL
UIBC SERPL-MCNC: 108 UG/DL
VIT B12 SERPL-MCNC: 678 PG/ML

## 2024-02-04 ENCOUNTER — EMERGENCY (EMERGENCY)
Facility: HOSPITAL | Age: 29
LOS: 1 days | Discharge: HOME | End: 2024-02-04
Attending: STUDENT IN AN ORGANIZED HEALTH CARE EDUCATION/TRAINING PROGRAM
Payer: COMMERCIAL

## 2024-02-04 VITALS
HEART RATE: 79 BPM | OXYGEN SATURATION: 98 % | RESPIRATION RATE: 16 BRPM | TEMPERATURE: 98 F | DIASTOLIC BLOOD PRESSURE: 78 MMHG | SYSTOLIC BLOOD PRESSURE: 120 MMHG

## 2024-02-04 PROCEDURE — 99282 EMERGENCY DEPT VISIT SF MDM: CPT

## 2024-02-04 PROCEDURE — 99283 EMERGENCY DEPT VISIT LOW MDM: CPT

## 2024-02-04 PROCEDURE — 99053 MED SERV 10PM-8AM 24 HR FAC: CPT
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
## 2024-02-04 NOTE — ED PROVIDER NOTE - NS ED ROS FT
Skin: (+) Abrasions  Respiratory: denies SMITH, SOB, cough  Cardiovascular: denies chest pain, palpitations  GI: denies abdominal pain, n/v  MSK: (+) left hand pain, no back pain, no neck pain  Neuro: denies headache, dizziness, weakness, numbness

## 2024-02-04 NOTE — ED PROVIDER NOTE - CLINICAL SUMMARY MEDICAL DECISION MAKING FREE TEXT BOX
29 y/o male with pmhx gluten intolerance presents to the ED for abrasions to left hand. Upon exam, well appearing male in no acute distress, non-toxic, neurovascularly intact, full ROM of hand and wrist, negative snuffbox tenderness with superficial abrasions to dorsal surface of the hand. Declined pain medications and imaging in ED. Pt re-assessed at this time, feeling well, noting improvement in symptoms. VSS, tolerating PO intake, ambulating in ED with steady gait. All results reviewed with pt, all questions answered. Pt provided copy of all results. Return precautions given. Stable for dc. Case discussed with Attending. 29 y/o male with pmhx gluten intolerance presents to the ED for abrasions to left hand. Upon exam, well appearing male in no acute distress, non-toxic, neurovascularly intact, full ROM of hand and wrist, negative snuffbox tenderness with superficial abrasions to dorsal surface of the hand. Declined pain medications and imaging in ED. No obvious deformity. Pt re-assessed at this time, feeling well, noting improvement in symptoms. VSS, tolerating PO intake, ambulating in ED with steady gait. All results reviewed with pt, all questions answered. Pt provided copy of all results. Return precautions given. Stable for dc. Case discussed with Attending.

## 2024-02-04 NOTE — ED PROVIDER NOTE - ATTENDING APP SHARED VISIT CONTRIBUTION OF CARE
28y M presents for minor left hand injury sustained while at work as . Pt with superficial abrasions. Low suspicion for bony injury; pt declining meds/imaging. Medically stable for discharge.

## 2024-02-04 NOTE — ED PROVIDER NOTE - NSFOLLOWUPINSTRUCTIONS_ED_ALL_ED_FT
ABRASIONS- HAND     Please follow up with Primary Care Provider within 3-5 days   Please take tylenol or motrin for pain

## 2024-02-04 NOTE — ED PROVIDER NOTE - PATIENT PORTAL LINK FT
You can access the FollowMyHealth Patient Portal offered by [facility] by registering at the following website: [email or web address]. By joining [facility]’s FollowMyHealth portal, you will also be able to view your health information using other applications (apps) compatible with our system.

## 2024-02-04 NOTE — ED PROVIDER NOTE - PHYSICAL EXAMINATION
Gen: No acute distress, non toxic male, speaking in full sentences   HENT: NC/AT, Mucous membranes moist  Eyes: pink conjunctivae, EOMI, PERRL  CV: RRR, nl s1/s2, chest wall non-tender   Resp: CTAB, normal rate and effort, non-labored breathing   GI: Abdomen soft, NT, ND. No rebound, no guarding  Neuro: A&O x 3, sensorimotor intact without deficits, sensation intact bilat   3+ distal radial pulses intact   MSK: (+) Left hand tenderness, 5/5 Strength, A-okay/thumbs-up/opposition intact, No midline spine or joint tenderness to palpation, Full ROM ext x 4  Skin: (+) left hand abrasions, intact and perfused  Ambulatory Gen: No acute distress, non toxic male, speaking in full sentences   HENT: NC/AT, Mucous membranes moist  Eyes: pink conjunctivae, EOMI, PERRL  CV: RRR, nl s1/s2, chest wall non-tender   Resp: CTAB, normal rate and effort, non-labored breathing   GI: Abdomen soft, NT, ND. No rebound, no guarding  Neuro: A&O x 3, sensorimotor intact without deficits, sensation intact bilat   3+ distal radial pulses intact   MSK: (+) Left hand tenderness, 5/5 Strength, A-okay/thumbs-up/opposition intact, No midline spine or joint tenderness to palpation, Full ROM ext x 4  Skin: (+) left hand superficial abrasions, intact and perfused  Ambulatory Gen: No acute distress, non toxic male, speaking in full sentences   HENT: NC/AT, Mucous membranes moist  Eyes: pink conjunctivae, EOMI, PERRL  CV: RRR, nl s1/s2, chest wall non-tender   Resp: CTAB, normal rate and effort, non-labored breathing   GI: Abdomen soft, NT, ND. No rebound, no guarding  Neuro: A&O x 3, sensorimotor intact without deficits, sensation intact bilat   3+ distal radial pulses intact   MSK: (+) Left hand tenderness, no obvious deformity, no snuffbox tender, 5/5 Strength, A-okay/thumbs-up/opposition intact, No midline spine or joint tenderness to palpation, Full ROM ext x 4  Skin: (+) left hand superficial abrasions, intact and perfused  Ambulatory

## 2024-02-06 NOTE — ED ADULT NURSE NOTE - NSFALLUNIVINTERV_ED_ALL_ED
Bed/Stretcher in lowest position, wheels locked, appropriate side rails in place/Call bell, personal items and telephone in reach/Instruct patient to call for assistance before getting out of bed/chair/stretcher/Non-slip footwear applied when patient is off stretcher/[place] to call system/Physically safe environment - no spills, clutter or unnecessary equipment/Purposeful proactive rounding/Room/bathroom lighting operational, light cord in reach

## 2024-03-05 ENCOUNTER — APPOINTMENT (OUTPATIENT)
Dept: HEMATOLOGY ONCOLOGY | Facility: CLINIC | Age: 29
End: 2024-03-05

## 2024-03-26 DIAGNOSIS — E83.19 OTHER DISORDERS OF IRON METABOLISM: ICD-10-CM

## 2024-03-28 ENCOUNTER — APPOINTMENT (OUTPATIENT)
Dept: HEMATOLOGY ONCOLOGY | Facility: CLINIC | Age: 29
End: 2024-03-28

## 2024-04-09 ENCOUNTER — EMERGENCY (EMERGENCY)
Facility: HOSPITAL | Age: 29
LOS: 1 days | Discharge: HOME | End: 2024-04-09
Attending: EMERGENCY MEDICINE
Payer: COMMERCIAL

## 2024-04-09 VITALS
TEMPERATURE: 98 F | OXYGEN SATURATION: 100 % | SYSTOLIC BLOOD PRESSURE: 133 MMHG | DIASTOLIC BLOOD PRESSURE: 86 MMHG | RESPIRATION RATE: 20 BRPM | WEIGHT: 179.9 LBS | HEART RATE: 83 BPM

## 2024-04-09 PROBLEM — K90.41 NON-CELIAC GLUTEN SENSITIVITY: Chronic | Status: ACTIVE | Noted: 2024-02-04

## 2024-04-09 PROCEDURE — 99282 EMERGENCY DEPT VISIT SF MDM: CPT

## 2024-04-09 PROCEDURE — 99283 EMERGENCY DEPT VISIT LOW MDM: CPT

## 2024-04-09 NOTE — ED ADULT NURSE NOTE - NSFALLUNIVINTERV_ED_ALL_ED
Bed/Stretcher in lowest position, wheels locked, appropriate side rails in place/Call bell, personal items and telephone in reach/Instruct patient to call for assistance before getting out of bed/chair/stretcher/Non-slip footwear applied when patient is off stretcher/[place] to call system/Physically safe environment - no spills, clutter or unnecessary equipment/Purposeful proactive rounding/Room/bathroom lighting operational, light cord in reach
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
## 2024-04-09 NOTE — ED ADULT NURSE NOTE - CHIEF COMPLAINT
Patient comes to clinic for follow up anticoagulation visit.   Last INR 5/8/20 was 3.0.  Dose maintained per protocol.   Today's INR is 2.8 and is within goal range.    Current warfarin dosing verified with patient. Patient was informed that their INR result is within therapeutic range and instructed to maintain current dose per protocol. Discussed dose and return date for next INR.    INR today is 2.8, in range and down from previous INR 3.0 in three weeks while on continued decreased dose of 27.5mg/wk.  Previous stable dose was 30mg/wk.  Continue 27.5mg/wk and recheck INR in 4 weeks to watch INR trending.    Luz Marina Chawla NP is the onsite supervising provider.      Patient was instructed to contact the clinic with any unusual bleeding or bruising, any changes in medications, diet, health status, lifestyle, or any other changes, questions or concerns. Patient verbalized understanding of all discussed.      The patient is a 28y Male complaining of neck pain.
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
## 2024-04-09 NOTE — ED PROVIDER NOTE - ATTENDING APP SHARED VISIT CONTRIBUTION OF CARE
I, Leandro Hennessy, performed the initial face to face bedside interview with this patient regarding history of present illness, review of symptoms and relevant past medical, social and family history.  I completed an independent physical examination.  I was the initial provider who evaluated this patient. I have signed out the follow up of any pending tests (i.e. labs, radiological studies) to the ACP.  I have communicated the patient’s plan of care and disposition with the ACP.

## 2024-05-21 ENCOUNTER — OFFICE (OUTPATIENT)
Dept: URBAN - METROPOLITAN AREA CLINIC 12 | Facility: CLINIC | Age: 29
Setting detail: OPHTHALMOLOGY
End: 2024-05-21
Payer: COMMERCIAL

## 2024-05-21 DIAGNOSIS — H43.393: ICD-10-CM

## 2024-05-21 PROCEDURE — 92014 COMPRE OPH EXAM EST PT 1/>: CPT | Performed by: OPHTHALMOLOGY

## 2024-05-21 ASSESSMENT — CONFRONTATIONAL VISUAL FIELD TEST (CVF)
OS_FINDINGS: FULL
OD_FINDINGS: FULL

## 2024-07-18 ENCOUNTER — APPOINTMENT (OUTPATIENT)
Dept: DERMATOLOGY | Facility: CLINIC | Age: 29
End: 2024-07-18
Payer: COMMERCIAL

## 2024-07-18 PROCEDURE — 99213 OFFICE O/P EST LOW 20 MIN: CPT

## 2024-07-26 NOTE — ED ADULT TRIAGE NOTE - INTERNATIONAL TRAVEL
Chart reviewed for 6A OR case which was then postponed. Report not received from floor nurse.  [id number] TRANSFER - IN REPORT:    Verbal report received from BETY Villalobos on [facility]  being received from Room 561 for ordered procedure      Report consisted of patient's Situation, Background, Assessment and   Recommendations(SBAR).     Information from the following report(s) ED Encounter Summary, Adult Overview, and Procedure Verification was reviewed with the receiving nurse.    Opportunity for questions and clarification was provided.      Assessment completed upon patient's arrival to unit and care assumed.    
TRANSFER - OUT REPORT:    Verbal report given to BETY Levine(name) on Jayden Thompson  being transferred to [facility](unit) for routine post-op       Report consisted of patient’s Situation, Background, Assessment and   Recommendations(SBAR).     Time Pre op antibiotic given:  Anesthesia Stop time: 8:22    Information from the following report(s) SBAR, Kardex, OR Summary, Procedure Summary, Intake/Output, MAR, Med Rec Status, and Cardiac Rhythm SR  was reviewed with the receiving nurse.    Opportunity for questions and clarification was provided.     Is the patient on 02? No       L/Min        Other     Is the patient on a monitor? No    Is the nurse transporting with the patient? No    Surgical Waiting Area notified of patient's transfer from PACU? No,patient states not to call anyone      
No

## 2025-02-04 ENCOUNTER — APPOINTMENT (OUTPATIENT)
Dept: DERMATOLOGY | Facility: CLINIC | Age: 30
End: 2025-02-04
Payer: COMMERCIAL

## 2025-02-04 PROCEDURE — 99214 OFFICE O/P EST MOD 30 MIN: CPT

## 2025-05-03 NOTE — ED PROVIDER NOTE - CONSTITUTIONAL, MLM
normal... 10.0   11.28 )-----------( 585      ( 03 May 2025 14:53 )             28.8     130[L]  |  91[L]  |  15.7  ----------------------------<  107[H]       3.9   |  26.0  |  0.38[L]    Ca    8.6      03 May 2025 14:53    TPro  5.9[L]  /  Alb  3.1[L]  /  TBili  2.1[H]  /  DBili  x   /  AST  25  /  ALT  44[H]  /  AlkPhos  144[H]      PT/INR: 14.5/1.25 (25 @ 14:53)  PTT: 28.8 (25 @ 14:53)                          Urinalysis Basic - ( 03 May 2025 16:11 )  Color: Yellow / Appearance: Clear / S.010 / pH: 7.0  Gluc: Negative mg/dL / Ketone: Negative mg/dL  / Bili: Negative / Urobili: 1.0 mg/dL   Blood: Small / Protein: Negative mg/dL / Nitrite: Negative   Leuk Esterase: Moderate / RBC: 3 /HPF / WBC 3 /HPF   Sq Epi: 1 /HPF / Bacteria: Negative /HPF  Hyaline Casts: x/WBC Casts: x          Urinalysis with Rflx Culture (collected 03 May 2025 16:11) 10.0   11.28 )-----------( 585      ( 03 May 2025 14:53 )             28.8     130[L]  |  91[L]  |  15.7  ----------------------------<  107[H]       3.9   |  26.0  |  0.38[L]    Ca    8.6      03 May 2025 14:53    TPro  5.9[L]  /  Alb  3.1[L]  /  TBili  2.1[H]  /  DBili  x   /  AST  25  /  ALT  44[H]  /  AlkPhos  144[H]      PT/INR: 14.5/1.25 (25 @ 14:53)  PTT: 28.8 (25 @ 14:53)      Urinalysis Basic - ( 03 May 2025 16:11 )  Color: Yellow / Appearance: Clear / S.010 / pH: 7.0  Gluc: Negative mg/dL / Ketone: Negative mg/dL  / Bili: Negative / Urobili: 1.0 mg/dL   Blood: Small / Protein: Negative mg/dL / Nitrite: Negative   Leuk Esterase: Moderate / RBC: 3 /HPF / WBC 3 /HPF   Sq Epi: 1 /HPF / Bacteria: Negative /HPF  Hyaline Casts: x/WBC Casts: x      Urinalysis with Rflx Culture (collected 03 May 2025 16:11)      CTA-Chest  LUNGS AND LARGE AIRWAYS: Increased AP diameter. Left upper lobe wedge resection. Patent central airways. Redemonstrated patchy consolidation in the right lower lobe, slightly increased compared to prior. Redemonstrated groundglass nodularity in the right upper and middle lobes. Stable lingular micronodule.  PLEURA: Small right pleural effusion.  VESSELS: No pulmonary embolism.  HEART: Heart size is normal. No pericardial effusion.  MEDIASTINUM AND JESSICA: Redemonstrated enlarged right hilar lymph node measuring 1.7 cm.  CHEST WALL AND LOWER NECK: Within normal limits.  VISUALIZED UPPER ABDOMEN: Within normal limits.  BONES: Degenerative changes. Exaggerated thoracic kyphosis.    IMPRESSION:  No pulmonary embolism.    Right lower lobe pneumonia, slightly increased from prior.    Small right pleural effusion. Well appearing, awake, alert, oriented to person, place, time/situation and in no apparent distress.